# Patient Record
(demographics unavailable — no encounter records)

---

## 2024-10-10 NOTE — HISTORY OF PRESENT ILLNESS
[Influenza] : Influenza [Meningococcal ACWY] : Meningococcal ACWY [HPV] : HPV [FreeTextEntry1] : Patient is 15yo female seen for vaccine update No current concerns She has a low ferritin = 8

## 2024-10-16 NOTE — DISCUSSION/SUMMARY
[FreeTextEntry1] : 16-year-old female presenting with a cough and fatigue; also requesting contraception  Cough/Fatigue -no acute concern on assessment -COVID-PCR ordered  -Fatigue possibly related to viral process vs lack of intake -Counseled on supportive care. Encouraged rest. Increase fluids. Advised against use of cough syrups. Use honey & tea instead. -Advised patient to seek urgent care with worsening symptoms, difficulty breathing, confusion, or difficulty staying awake. -Disposition: Returned to class, patient going to her lunch period  Family Planning/Inititation of Oral Contraception -Contraceptive methods reviewed -Negative urine pregnancy test.  -Consent reviewed and signed.  -Dispensed one month supply of Norgestimate-Eth Estradiol -Counseled re: ACHES, potential side effects, and protocol for missed pills.  -Encouraged consistent condom use for STI prevention, condoms given -CT/GC previous done 9/30/24 and negative -Return to clinic in 3 weeks for BC surveillance and repeat pregnancy test and for anemia f/u

## 2024-10-16 NOTE — REVIEW OF SYSTEMS
[Malaise] : malaise [Cough] : cough [Nasal Discharge] : no nasal discharge [Nasal Congestion] : no nasal congestion [Sore Throat] : no sore throat

## 2024-10-16 NOTE — HISTORY OF PRESENT ILLNESS
[de-identified] : tired [FreeTextEntry6] : 16-year-old female presenting with complaints of feeling warm and sleepy and weak. Patient reports she began feeling this way this morning. Reports she woke up feeling like she was going to throw up but did not. Reports the nausea went away on its own.  -Went to bed at about 9/10PM and woke up at about 7AM -Patient reports she only had [place] and [place] today because she was in a hurry to get to school and running late. Reports she has not had a lunch period yet -Dinner last night: Ramen noodles and ice cream -Took ferrous supplement this morning on an empty stomach  Patient reports she has been having a cough since earlier today. Patient denies any sore throat, runny nose, congestion. Denies any known sick contacts  Patient currently on ferrous sulfate once a day for CAYLA  Last sex: coitarche-1 week ago, used a condom- first time and only time having sex ever Patient reports she is interested in birth control, patient not planning a pregnancy in the next year Reports she needs a contraceptive method that she can keep from her mother  Patient denies history of migraines with aura, gallbladder or liver disease, hypertension, breast cancer, or family history of DVT, PE, or blood clot. Patient reports regular month periods.  9/23/24-9/27/24 Bleeding x5 days Pads per day: 3-4 pads per day Regular montly periods

## 2024-10-16 NOTE — HISTORY OF PRESENT ILLNESS
[de-identified] : tired [FreeTextEntry6] : 16-year-old female presenting with complaints of feeling warm and sleepy and weak. Patient reports she began feeling this way this morning. Reports she woke up feeling like she was going to throw up but did not. Reports the nausea went away on its own.  -Went to bed at about 9/10PM and woke up at about 7AM -Patient reports she only had [place] and [place] today because she was in a hurry to get to school and running late. Reports she has not had a lunch period yet -Dinner last night: Ramen noodles and ice cream -Took ferrous supplement this morning on an empty stomach  Patient reports she has been having a cough since earlier today. Patient denies any sore throat, runny nose, congestion. Denies any known sick contacts  Patient currently on ferrous sulfate once a day for CAYLA  Last sex: coitarche-1 week ago, used a condom- first time and only time having sex ever Patient reports she is interested in birth control, patient not planning a pregnancy in the next year Reports she needs a contraceptive method that she can keep from her mother  Patient denies history of migraines with aura, gallbladder or liver disease, hypertension, breast cancer, or family history of DVT, PE, or blood clot. Patient reports regular month periods.  9/23/24-9/27/24 Bleeding x5 days Pads per day: 3-4 pads per day Regular montly periods

## 2024-10-28 NOTE — HISTORY OF PRESENT ILLNESS
[de-identified] : Wants to talk about OCP issue [FreeTextEntry6] : 16 year old female who was started on OCP on Adolfo Oct 20. Pt usually gets menses for 5 days and she bled for 6 days. Called [facility] on Friday and was distressed that her period was longer than usual. Reassured by NP and instructed to continue OCP as ordered and come in today for recheck. Pt states that the bleeding isn't excessively heavy but concerned that she is still bleeding. She became concerned over the fact that her mom takes notice  her pads and will question her if she bleeds too long and send her to the doctor. She stopped the OCPs on Saturday and has been off since then. Still has a light period now. " I'm sick of bleeding, I can't stand it".

## 2024-10-28 NOTE — DISCUSSION/SUMMARY
[FreeTextEntry1] : Discussed her case with [facility] Verde. We explained to Krystle that her bleeding pattern is not very concerning and stressed need to be patient and her menses will probably correct itself. We discussed other forms of BC but she wants to have a method where she has periods every month. We decided to double her OCP to 2 pills daily starting tonight and RTC in 3 days (Thursday to recheck). If bleeding stops we may remove placebo from the pack and continue one pill daily and give her a new pack. Gave her a supply of pads since she is concerned about using too many at home. Continue with her daily Fe therapy for anemia and due for CBC and Ferriten soon since she started Iron on Oct 10.

## 2024-10-31 NOTE — HISTORY OF PRESENT ILLNESS
[de-identified] : contraceptive follow-up [FreeTextEntry6] : 16-year-old female presenting for follow-up of oral contraceptive. Patient reports she typically has her menstrual period for about 5 days but bled for about 10 days in total. Started OCP on the first day of her menstrual period on 10/20/24  Patient seen in health center on Monday for the prolonged bleeding which was about day #8 of bleeding. Patient reports she doubled on the OCP pills as directed and the bleeding stopped yesterday.  Patient denies any other complaints. Denies ACHES.  Last sex: Oct 23rd, used a condom; uses condoms all of the time

## 2024-10-31 NOTE — DISCUSSION/SUMMARY
[FreeTextEntry1] : 16-year-old female presenting for surveillance of oral contraception after prolonged menses with the start of OCP  Family Planning -Patient with cessation of menstrual bleeding after doubling CAMERON for 3 days -dispensed Sprintec one month supply- start 1 pill daily today -Negative urine pregnancy test.  -Consent previously reviewed and signed.  -Counseled re: ACHES, potential side effects, and protocol for missed pills.  -Encouraged consistent condom use for STI prevention.  -Return to clinic in 3 weeks for BC surveillance

## 2024-10-31 NOTE — HISTORY OF PRESENT ILLNESS
[de-identified] : contraceptive follow-up [FreeTextEntry6] : 16-year-old female presenting for follow-up of oral contraceptive. Patient reports she typically has her menstrual period for about 5 days but bled for about 10 days in total. Started OCP on the first day of her menstrual period on 10/20/24  Patient seen in health center on Monday for the prolonged bleeding which was about day #8 of bleeding. Patient reports she doubled on the OCP pills as directed and the bleeding stopped yesterday.  Patient denies any other complaints. Denies ACHES.  Last sex: Oct 23rd, used a condom; uses condoms all of the time

## 2024-11-07 NOTE — DISCUSSION/SUMMARY
[FreeTextEntry1] : 16-year-old female presenting for follow-up Managment of CAYLA  CAYLA -CBC and ferritin ordered -Dispensed refill of ferrous sulfate 324 mg 1 tab, PO, daily for 30 days.  Instructed patient to take with vitamin C.  Avoid taking with milk.  -Increase intake of iron-rich foods.  -Return to clinic in 1 month to assess adherence   Return as scheduled on 11/19/24 for surveillance of OCP

## 2024-11-07 NOTE — HISTORY OF PRESENT ILLNESS
[de-identified] : CAYLA [FreeTextEntry6] : 16-year-old female presenting for follow-up Managment of CAYLA  Patient has been on ferrous sulfate once a day for about a month. Patient reports she has been taking it daily for the most part but does have about a week or less left of the tablets. Patient denies any complaints such as abdominal pain or constipation on the supplements.  Patient reports she has been eating fish, chicken and veggies to increase her dietary iron intake.   Patient previously with prolonged menstrual bleeding with starting OCP which was resolved after doubling OCP for 3 days. Patient resumed taking 1 tablet daily for about 1 week and denies any complaints or breakthrough bleeding.

## 2024-11-19 NOTE — DISCUSSION/SUMMARY
[FreeTextEntry1] : 16-year-old female presenting for surveillance of oral contraceptives and iron deficiency anemia.   1) Surveillance of Oral Contraceptives  -Negative urine pregnancy test.  -Consent reviewed, previously signed.  -Dispensed three month supply of Sprintec.  -For this month only advised pt to only take 4 days of placebo week and then start a new pack to minimize bleeding. -Counseled re: ACHES, potential side effects, and protocol for missed pills.  -Encouraged consistent condom use for STI prevention. Condoms offered.  -Return to [facility] center in 3 months for BC surveillance or sooner as needed.  2) Iron Deficiency Anemia  -Reviewed labs done on 11/7/24. No improvement with anemia.  -Increase dosing of iron supplement to twice daily.  -Return to health center mid-December 2024 to repeat CBC & ferritin.  -If not improvement will refer to hematology for possible iron infusion.

## 2024-11-19 NOTE — HISTORY OF PRESENT ILLNESS
[de-identified] : oral contraceptives  [FreeTextEntry6] : 16-year-old female presenting for oral contraceptives.   Last sexual activity: last week, used a condom. No new sexual partner since last testing. Pt feels safe in her relationship.   Pt is happy with oral contraceptives. No missed pills. Pt took a few pills late. Pt denies unwanted side effects or ACHES.   No bleeding since last period in October 2024.   Pt reports taking iron pills as directed. Pt is taking one pill per day. Pt eats a normal diet including meat.

## 2024-12-09 NOTE — HISTORY OF PRESENT ILLNESS
[de-identified] : anemia [FreeTextEntry6] : 16-year-old female presenting for follow-up management of CAYLA  Patient started twice daily ferrous sulfate about 3 weeks ago due to decreased hgb on once daily ferrous sulfate. Patient reports she has been taking supplement daily as prescribed for most days. Reports sometimes she takes 2 tablets at once, so she does not forget the evening dose. Reports she does sometimes had stomach pain with the supplements -Has about 6 pills of supplements remaining  Patient denies headaches, difficulty concentration, signs of PICA, dizziness  LMP: 11/24-11/29/24 Reports she only took 4 days of placebo as directed at previous visit to minimize bleeding. Reports menses lasted 5 days Reports increasing dietary intake of chicken and vegetables to increase iron  Denies any current concerns or complaints at this time

## 2024-12-09 NOTE — HISTORY OF PRESENT ILLNESS
[de-identified] : anemia [FreeTextEntry6] : 16-year-old female presenting for follow-up management of CAYLA  Patient started twice daily ferrous sulfate about 3 weeks ago due to decreased hgb on once daily ferrous sulfate. Patient reports she has been taking supplement daily as prescribed for most days. Reports sometimes she takes 2 tablets at once, so she does not forget the evening dose. Reports she does sometimes had stomach pain with the supplements -Has about 6 pills of supplements remaining  Patient denies headaches, difficulty concentration, signs of PICA, dizziness  LMP: 11/24-11/29/24 Reports she only took 4 days of placebo as directed at previous visit to minimize bleeding. Reports menses lasted 5 days Reports increasing dietary intake of chicken and vegetables to increase iron  Denies any current concerns or complaints at this time

## 2025-02-10 NOTE — PHYSICAL EXAM
[NL] : regular rate and rhythm, normal S1, S2 audible, no murmurs [FreeTextEntry5] : pale conjunctiva

## 2025-02-10 NOTE — DISCUSSION/SUMMARY
[FreeTextEntry1] : 17-year-old female presenting for surveillance of oral contraceptives and iron deficiency anemia.   1) Surveillance of Oral Contraceptives  -Negative urine pregnancy test.  -Consent reviewed, previously signed.  -Dispensed three month supply of oral contraceptives.  -Counseled re: ACHES, potential side effects, and protocol for missed pills.  -Encouraged consistent condom use for STI prevention.  -Return to [facility] center in 3 months for BC surveillance.  2) Iron Deficiency Anemia  -Last labs done 2/9/24. Hemoglobin 12 ng/mL(improved) and ferritin 8 ng/mL.  -Ordered CBC & ferritin.  -Dispensed ferrous sulfate 324 mg 1 tab po once daily for 30 days.  Instructed pt to take with vitamin C. Avoid taking with milk.  -Take iron with oral contraceptive to increase adherence. -Increase intake of iron-rich foods.  -Return to [facility] in 1 month to assess adherence and repeat labs.

## 2025-02-10 NOTE — HISTORY OF PRESENT ILLNESS
[de-identified] : anemia & birth control  [FreeTextEntry6] : 17-year-old female presenting for iron deficiency anemia and surveillance of oral contraceptives.   Pt reports taking iron supplement 4 times per week. Pt forgets on the other days.   Pt denies dizziness, shortness of breath, difficulty concentrating, shortness of breath, stomachaches, or headaches. Pt eats normal diet including meat.   Pt is taking oral contraceptive daily as directed. No missed pills. No unwanted side effects.   Last sexual activity: last week, no condom used. No new sexual partner since last testing.   Pt reports 5 days of bleeding with last period on 1/15/25 - pt reports flow was "normal," not heavy.

## 2025-02-25 NOTE — PHYSICAL EXAM
[Inflamed Nasal Mucosa] : inflamed nasal mucosa [Erythematous Oropharynx] : erythematous oropharynx [Clear to Auscultation Bilaterally] : clear to auscultation bilaterally [NL] : regular rate and rhythm, normal S1, S2 audible, no murmurs [Regular Rate and Rhythm] : regular rate and rhythm [Normal S1, S2 audible] : normal S1, S2 audible [Tachycardia] : tachycardia [Vesicles] : no vesicles [Exudate] : no exudate [FreeTextEntry3] : left outer ear canal: + pimple

## 2025-02-25 NOTE — HISTORY OF PRESENT ILLNESS
[de-identified] : sick  [FreeTextEntry6] : 17-year-old female presenting with runny nose, sore throat, productive cough with green phlegm, headache and left ear pain.   Pt reports URI symptoms began 1 week ago. Pt reports that her left ear pain started yesterday.   Pt reports that symptoms have been the same for about the past week especially with the cough. Pt reports difficulty sleeping due to the cough. Pt reports tactile fever last week.   Pt denies vomiting, diarrhea, or body aches.  Pt has been taking OTC Thera Flu and some other pills from her mother - pt does not know the name of the medication. Pt has also been drinking tea.   Pt denies sick contacts.

## 2025-02-25 NOTE — REVIEW OF SYSTEMS
[Fever] : fever [Headache] : headache [Nasal Discharge] : nasal discharge [Nasal Congestion] : nasal congestion [Sore Throat] : sore throat [Cough] : cough [Vomiting] : no vomiting [Diarrhea] : no diarrhea [Myalgia] : no myalgia [Rash] : no rash

## 2025-02-25 NOTE — DISCUSSION/SUMMARY
[FreeTextEntry1] : 17-year-old female presenting with viral illness including pharyngitis.   -HPI & exam consistent with viral illness.  -Collected COVID, flu, and RSV PCR. -Given persistent symptoms of pharyngitis collected throat culture.  -Dispensed sore throat lozenges x 3.  -Dispensed saline nasal spray. Use 1 spray each nostril 2 times per day.  -Encouraged rest. Increase fluids. Recommended tea with honey for cough.  -Advised that cough may linger for several weeks after illness.  -Return to health center if symptoms persist or worsen.  -Will call with results.   Note:  Ear pain likely due to pimple in left outer ear canal. Recommended warm compresses. Return if pain persists or worsens.

## 2025-04-10 NOTE — DATA REVIEWED
[de-identified] :  My interpretation of imaging done on 04/08/2025:  AP/Lateral views of the spine show a mild s-shaped curve in the cervical and thoracic spine. The discs at L2, L3, L4, and L5 are fused. Remainder of exam within normal limits.

## 2025-04-10 NOTE — PHYSICAL EXAM
[FreeTextEntry1] : General: WDWN, acting appropriate for age. HEENT: NCAT, Normal conjunctiva Cardio: Appears well perfused, no peripheral edema, brisk cap refill. Lungs: no obvious increased WOB, no audible wheeze heard without use of stethoscope. Abdomen: not examined. Skin: No visible rashes on exposed skin  Gait: normal gait for age without antalgia  Spine: Inspection of the skin reveals no cafe au lait spots or large birth marks. From behind, patient is well centered with head and shoulders appropriately aligned with pelvis. Mild shoulder asymmetry Spine is grossly midline. On Williams's Forward Bend, there is an elevation in the left thoracic region  NTTP over spinous processes and paraspinal musculature. Full range of motion at cervical, thoracic and lumbar spine with no pain or difficulty. No pelvic obliquity. No LLD  LE: Skin clean and intact. No deformity or lymphedema. Full ROM bilateral hips, knees and ankles. 5/5 motor strength in LE. SILT distally. DP 2+, BCR < 2 seconds.

## 2025-04-10 NOTE — ASSESSMENT
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 Chronic lower back pain.   Clinical findings and x-ray results were reviewed at length with the patient and parent. AP/Lat XRs of the spine today show   We discussed at length the natural history, etiology, pathoanatomy and treatment modalities of scoliosis with patient and parent. Family understands that curvatures over 10 degrees are closely monitored for progression, while curvatures over 25 degrees are typically treated with a bracing regimen to prevent further progression. For curvatures with magnitude of 40 degrees or more, surgical intervention is warranted. As patient is nearing skeletal maturity, there is a very low likelihood of her curve significantly progressing. At this time, no orthopedic intervention is warranted. We did discuss the findings of the fused discs in her lumbar spine. I explained that this is likely contributing to her lower back pain. Family understands the importance of improving and maintaining her flexibility to protect this area and maintain her ROM. I have recommended that the patient begin attending physical therapy sessions to improve strengthening about their core and balancing about their shoulder and back muscles; prescription was provided to family. Follow up in 6 months for clinical reevaluation and scoliosis x-rays.   Today's visit included obtaining the history from the child and parent, due to the child's age, the child could not be considered a reliable historian, requiring the parent to act as an independent historian. All questions answered. Family expressed understanding and agreement with the above.  This visit was conducted in the family's native Turkish language with the help of our MoA, Kierra.   FU 6 months x-rays scoliosis series    Documented by Brianna Marcus acting as a scribe for Dr. Jean on 04/08/2025.   The above documentation completed by the scribe is an accurate record of both my words and actions.

## 2025-04-10 NOTE — HISTORY OF PRESENT ILLNESS
[FreeTextEntry1] : 17-year-old female presents today with her mother for an initial evaluation of her congenital scoliosis. Patient reports that her school nurse recently noticed asymmetries and advised family to follow up with an orthopedist. According to patient, when she was younger, she was diagnosed with a congenital scoliosis. No treatment was indicated. She had seen several providers but had difficulties with scheduling follow up appointments. She also reports intermittent lower back pain. Patient denies any recent fevers, chills, or night sweats. Denies any recent trauma or injuries. She denies any radiating pain, numbness, tingling sensations, discomfort, weakness to LE, radiating LE pain, or bladder/bowel dysfunction. She has been participating in all her normal physical activities without restrictions or discomfort. Menarche at 12 years old. Patient's mother reports positive family history for scoliosis in maternal cousins, aunts, grandmother. No treatment was indicated. Here for initial orthopedic evaluation of congenital scoli.   Patient acted as [place]  for Mother.

## 2025-04-10 NOTE — DATA REVIEWED
[de-identified] :  My interpretation of imaging done on 04/08/2025:  AP/Lateral views of the spine show a mild s-shaped curve in the cervical and thoracic spine. The discs at L2, L3, L4, and L5 are fused. Remainder of exam within normal limits.

## 2025-04-10 NOTE — HISTORY OF PRESENT ILLNESS
[FreeTextEntry1] : 17-year-old female presents today with her mother for an initial evaluation of her congenital scoliosis. Patient reports that her school nurse recently noticed asymmetries and advised family to follow up with an orthopedist. According to patient, when she was younger, she was diagnosed with a congenital scoliosis. No treatment was indicated. She had seen several providers but had difficulties with scheduling follow up appointments. She also reports intermittent lower back pain. Patient denies any recent fevers, chills, or night sweats. Denies any recent trauma or injuries. She denies any radiating pain, numbness, tingling sensations, discomfort, weakness to LE, radiating LE pain, or bladder/bowel dysfunction. She has been participating in all her normal physical activities without restrictions or discomfort. Menarche at 12 years old. Patient's mother reports positive family history for scoliosis in maternal cousins, aunts, grandmother. No treatment was indicated. Here for initial orthopedic evaluation of congenital scoli.   Patient acted as Lebanese  for Mother.

## 2025-04-10 NOTE — ASSESSMENT
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 Chronic lower back pain.   Clinical findings and x-ray results were reviewed at length with the patient and parent. AP/Lat XRs of the spine today show   We discussed at length the natural history, etiology, pathoanatomy and treatment modalities of scoliosis with patient and parent. Family understands that curvatures over 10 degrees are closely monitored for progression, while curvatures over 25 degrees are typically treated with a bracing regimen to prevent further progression. For curvatures with magnitude of 40 degrees or more, surgical intervention is warranted. As patient is nearing skeletal maturity, there is a very low likelihood of her curve significantly progressing. At this time, no orthopedic intervention is warranted. We did discuss the findings of the fused discs in her lumbar spine. I explained that this is likely contributing to her lower back pain. Family understands the importance of improving and maintaining her flexibility to protect this area and maintain her ROM. I have recommended that the patient begin attending physical therapy sessions to improve strengthening about their core and balancing about their shoulder and back muscles; prescription was provided to family. Follow up in 6 months for clinical reevaluation and scoliosis x-rays.   Today's visit included obtaining the history from the child and parent, due to the child's age, the child could not be considered a reliable historian, requiring the parent to act as an independent historian. All questions answered. Family expressed understanding and agreement with the above.  This visit was conducted in the family's native Welsh language with the help of our MoA, Kierra.   FU 6 months x-rays scoliosis series    Documented by Brianna Marcus acting as a scribe for Dr. Jean on 04/08/2025.   The above documentation completed by the scribe is an accurate record of both my words and actions.

## 2025-04-25 NOTE — HISTORY OF PRESENT ILLNESS
[de-identified] : OCP refill [FreeTextEntry6] : 17-year-old female presenting for OCP refill  Patient on CAMERON Patient denies any concerns or complaints, Patient denies ACHES Patient denies any missed pills. Likes the method and wants to continue LMP: 4/10/25  Last sex: One week ago, did not use a condom, uses condoms sometimes Denies any new partners since last STI testing Has been with current partner x8 months, feels safe in relationship  Denies any current concerns or complaints at this time

## 2025-04-25 NOTE — DISCUSSION/SUMMARY
[FreeTextEntry1] : 17-year-old female presenting for surveillance of OCP  Family Planning/Surveillance of Oral Contraceptive -Negative urine pregnancy test.  -Consent previously reviewed and signed.  -Dispensed 3- month supply of Norgestimate-Eth Estradiol 0.25-35 MG-MCG -Counseled re: ACHES, potential side effects, and protocol for missed pills.  -Encouraged consistent condom use for STI prevention, condoms given -Urine CT/GC ordered, will contact positive results for treatment -Return to clinic as needed

## 2025-05-27 NOTE — PHYSICAL EXAM
[NL] : no acute distress, alert [Pelon: ____] : Pelon [unfilled] [FreeTextEntry6] : + bump on right labia majora, flat, nontender, no discharge

## 2025-05-27 NOTE — HISTORY OF PRESENT ILLNESS
[de-identified] : birth control refill  [FreeTextEntry6] : 17-year-old female presenting for surveillance of oral contraceptives.   Pt is happy with the method. Pt denies any unwanted side effects. Pt denies ACHES. Pt reports that she sometimes takes the pill a few hours late and one time missed a day but doubled up the next day. Pt still has 2 packs remaining from last visit but needs more pills for the summer.   Last sexual activity: 2 weeks ago, no condom used. No new sexual partner since last testing. Pt feels safe in her relationship.   Pt complains of a bump on the labia near the clitoris. Pt reports that the bump seems to go away but then recurs and is inflamed again. Pt reports that she has had some discharge from the bump. Pt reports that she frequently tries to pop the bump when there is white color at the top.  Pt denies any pain or itching with the bump. Pt shaves in the genital area.

## 2025-05-27 NOTE — DISCUSSION/SUMMARY
[FreeTextEntry1] : 17 year old female presenting for surveillance of oral contraceptive pills.   -Negative urine pregnancy test.  -Consent reviewed, previously signed.  -Dispensed two-month supply of oral contraceptives. Pt has enough packs to get through the summer. -Counseled re: ACHES, potential side effects, and protocol for missed pills.  -Encouraged consistent condom use for STI prevention. Condoms offered. -Return to health center in 3 weeks for BC surveillance and repeat pregnancy test.   Note: GYN exam consistent with a resolving folliculitis. Counseled on safe genital hair removal. Handout given from [facility]. If pt has another ingrown hair or folliculitis advised use of warm compresses and avoiding squeezing or popping the bump.